# Patient Record
Sex: MALE | Race: WHITE | HISPANIC OR LATINO | Employment: OTHER | URBAN - METROPOLITAN AREA
[De-identification: names, ages, dates, MRNs, and addresses within clinical notes are randomized per-mention and may not be internally consistent; named-entity substitution may affect disease eponyms.]

---

## 2018-10-08 ENCOUNTER — HOSPITAL ENCOUNTER (OUTPATIENT)
Dept: NON INVASIVE DIAGNOSTICS | Facility: HOSPITAL | Age: 38
Discharge: HOME | End: 2018-10-08
Payer: MEDICARE

## 2018-10-08 ENCOUNTER — TRANSCRIBE ORDERS (OUTPATIENT)
Dept: ADMINISTRATIVE | Facility: HOSPITAL | Age: 38
End: 2018-10-08

## 2018-10-08 ENCOUNTER — APPOINTMENT (OUTPATIENT)
Dept: LAB | Facility: HOSPITAL | Age: 38
End: 2018-10-08
Payer: MEDICARE

## 2018-10-08 DIAGNOSIS — Z79.899 NEED FOR PROPHYLACTIC CHEMOTHERAPY: ICD-10-CM

## 2018-10-08 DIAGNOSIS — F31.2 SEVERE MANIC BIPOLAR I DISORDER WITH PSYCHOTIC FEATURES (HCC): ICD-10-CM

## 2018-10-08 DIAGNOSIS — Z79.899 NEED FOR PROPHYLACTIC CHEMOTHERAPY: Primary | ICD-10-CM

## 2018-10-08 LAB
ALBUMIN SERPL BCP-MCNC: 4.6 G/DL (ref 3–5.2)
ALP SERPL-CCNC: 101 U/L (ref 43–122)
ALT SERPL W P-5'-P-CCNC: 35 U/L (ref 9–52)
ANION GAP SERPL CALCULATED.3IONS-SCNC: 8 MMOL/L (ref 5–14)
AST SERPL W P-5'-P-CCNC: 32 U/L (ref 17–59)
BASOPHILS # BLD AUTO: 0 THOUSANDS/ΜL (ref 0–0.1)
BASOPHILS NFR BLD AUTO: 0 % (ref 0–1)
BILIRUB SERPL-MCNC: 0.3 MG/DL
BUN SERPL-MCNC: 10 MG/DL (ref 5–25)
CALCIUM SERPL-MCNC: 9.7 MG/DL (ref 8.4–10.2)
CHLORIDE SERPL-SCNC: 102 MMOL/L (ref 97–108)
CHOLEST SERPL-MCNC: 221 MG/DL
CO2 SERPL-SCNC: 30 MMOL/L (ref 22–30)
CREAT SERPL-MCNC: 0.76 MG/DL (ref 0.7–1.5)
EOSINOPHIL # BLD AUTO: 0.3 THOUSAND/ΜL (ref 0–0.4)
EOSINOPHIL NFR BLD AUTO: 4 % (ref 0–6)
ERYTHROCYTE [DISTWIDTH] IN BLOOD BY AUTOMATED COUNT: 12.8 %
GFR SERPL CREATININE-BSD FRML MDRD: 118 ML/MIN/1.73SQ M
GLUCOSE P FAST SERPL-MCNC: 110 MG/DL (ref 70–99)
HCT VFR BLD AUTO: 46.6 % (ref 41–53)
HDLC SERPL-MCNC: 30 MG/DL (ref 40–59)
HGB BLD-MCNC: 15.9 G/DL (ref 13.5–17.5)
LDLC SERPL CALC-MCNC: 128 MG/DL
LYMPHOCYTES # BLD AUTO: 2.2 THOUSANDS/ΜL (ref 0.5–4)
LYMPHOCYTES NFR BLD AUTO: 30 % (ref 20–50)
MCH RBC QN AUTO: 31.3 PG (ref 26–34)
MCHC RBC AUTO-ENTMCNC: 34.1 G/DL (ref 31–36)
MCV RBC AUTO: 92 FL (ref 80–100)
MONOCYTES # BLD AUTO: 0.5 THOUSAND/ΜL (ref 0.2–0.9)
MONOCYTES NFR BLD AUTO: 6 % (ref 1–10)
NEUTROPHILS # BLD AUTO: 4.3 THOUSANDS/ΜL (ref 1.8–7.8)
NEUTS SEG NFR BLD AUTO: 59 % (ref 45–65)
NONHDLC SERPL-MCNC: 191 MG/DL
PLATELET # BLD AUTO: 229 THOUSANDS/UL (ref 150–450)
PMV BLD AUTO: 8.6 FL (ref 8.9–12.7)
POTASSIUM SERPL-SCNC: 4.5 MMOL/L (ref 3.6–5)
PROT SERPL-MCNC: 7.7 G/DL (ref 5.9–8.4)
RBC # BLD AUTO: 5.07 MILLION/UL (ref 4.5–5.9)
SODIUM SERPL-SCNC: 140 MMOL/L (ref 137–147)
TRIGL SERPL-MCNC: 316 MG/DL
TSH SERPL DL<=0.05 MIU/L-ACNC: 2.51 UIU/ML (ref 0.47–4.68)
WBC # BLD AUTO: 7.3 THOUSAND/UL (ref 4.5–11)

## 2018-10-08 PROCEDURE — 93005 ELECTROCARDIOGRAM TRACING: CPT

## 2018-10-08 PROCEDURE — 80053 COMPREHEN METABOLIC PANEL: CPT

## 2018-10-08 PROCEDURE — 84443 ASSAY THYROID STIM HORMONE: CPT

## 2018-10-08 PROCEDURE — 80061 LIPID PANEL: CPT

## 2018-10-08 PROCEDURE — 85025 COMPLETE CBC W/AUTO DIFF WBC: CPT

## 2018-10-08 PROCEDURE — 36415 COLL VENOUS BLD VENIPUNCTURE: CPT

## 2018-10-09 LAB
ATRIAL RATE: 63 BPM
P AXIS: 6 DEGREES
PR INTERVAL: 134 MS
QRS AXIS: 20 DEGREES
QRSD INTERVAL: 78 MS
QT INTERVAL: 416 MS
QTC INTERVAL: 425 MS
T WAVE AXIS: 20 DEGREES
VENTRICULAR RATE: 63 BPM

## 2018-10-09 PROCEDURE — 93010 ELECTROCARDIOGRAM REPORT: CPT | Performed by: INTERNAL MEDICINE

## 2019-09-05 ENCOUNTER — TELEPHONE (OUTPATIENT)
Dept: FAMILY MEDICINE CLINIC | Facility: CLINIC | Age: 39
End: 2019-09-05

## 2020-12-07 ENCOUNTER — PATIENT OUTREACH (OUTPATIENT)
Dept: FAMILY MEDICINE CLINIC | Facility: CLINIC | Age: 40
End: 2020-12-07

## 2020-12-07 DIAGNOSIS — Z74.8 ASSISTANCE NEEDED WITH TRANSPORTATION: Primary | ICD-10-CM

## 2020-12-07 DIAGNOSIS — R87.616 SATISFACTORY CERVICAL SMEAR BUT LACKING TRANSFORMATION ZONE: ICD-10-CM

## 2020-12-07 PROBLEM — Z78.9 NEEDS ASSISTANCE WITH COMMUNITY RESOURCES: Status: ACTIVE | Noted: 2020-12-07

## 2020-12-07 PROBLEM — Z83.3 FAMILY HISTORY OF DIABETES MELLITUS (DM): Status: ACTIVE | Noted: 2020-12-07

## 2020-12-07 PROBLEM — K59.00 CONSTIPATION: Status: ACTIVE | Noted: 2020-12-07

## 2020-12-07 PROBLEM — F31.70 BIPOLAR AFFECTIVE DISORDER IN REMISSION (HCC): Status: ACTIVE | Noted: 2020-12-07

## 2020-12-07 PROBLEM — G89.21 CHRONIC PAIN AFTER TRAUMATIC INJURY: Status: ACTIVE | Noted: 2020-12-07

## 2020-12-07 PROBLEM — F43.10 PTSD (POST-TRAUMATIC STRESS DISORDER): Status: ACTIVE | Noted: 2020-12-07

## 2020-12-08 ENCOUNTER — PATIENT OUTREACH (OUTPATIENT)
Dept: FAMILY MEDICINE CLINIC | Facility: CLINIC | Age: 40
End: 2020-12-08

## 2020-12-09 ENCOUNTER — PATIENT OUTREACH (OUTPATIENT)
Dept: FAMILY MEDICINE CLINIC | Facility: CLINIC | Age: 40
End: 2020-12-09

## 2020-12-09 ENCOUNTER — TELEPHONE (OUTPATIENT)
Dept: FAMILY MEDICINE CLINIC | Facility: CLINIC | Age: 40
End: 2020-12-09

## 2020-12-10 ENCOUNTER — TELEPHONE (OUTPATIENT)
Dept: FAMILY MEDICINE CLINIC | Facility: CLINIC | Age: 40
End: 2020-12-10

## 2020-12-16 ENCOUNTER — PATIENT OUTREACH (OUTPATIENT)
Dept: FAMILY MEDICINE CLINIC | Facility: CLINIC | Age: 40
End: 2020-12-16

## 2020-12-18 ENCOUNTER — PATIENT OUTREACH (OUTPATIENT)
Dept: FAMILY MEDICINE CLINIC | Facility: CLINIC | Age: 40
End: 2020-12-18

## 2020-12-23 ENCOUNTER — PATIENT OUTREACH (OUTPATIENT)
Dept: FAMILY MEDICINE CLINIC | Facility: CLINIC | Age: 40
End: 2020-12-23

## 2020-12-30 ENCOUNTER — PATIENT OUTREACH (OUTPATIENT)
Dept: FAMILY MEDICINE CLINIC | Facility: CLINIC | Age: 40
End: 2020-12-30

## 2021-01-06 ENCOUNTER — PATIENT OUTREACH (OUTPATIENT)
Dept: FAMILY MEDICINE CLINIC | Facility: CLINIC | Age: 41
End: 2021-01-06

## 2021-01-06 NOTE — PROGRESS NOTES
Outgoing Calls:  1/6/21    CHW did call [place] to request information on rescheduled [facility] evaluation with [facility]  CHW was assisted by Rufina Primrose and he informed CHW that the evaluation was rescheduled for 2/10/21  CHW did call pt to inform him of this  Pt did not answer and voice message was not set up  CHW to outreach to pt on 1/13/21

## 2021-01-13 ENCOUNTER — PATIENT OUTREACH (OUTPATIENT)
Dept: FAMILY MEDICINE CLINIC | Facility: CLINIC | Age: 41
End: 2021-01-13

## 2021-01-13 NOTE — LETTER
01/13/21    Dear Edel Browne,    I have made several attempts to call you by phone  I called you on 1/6/21 and 1/13/21 and both times was unable to leave a voice message  It is important that you contact me back at [phone] so that I can assist with your care needs        Sincerely,       Malena Ragsdale, W

## 2021-01-13 NOTE — PROGRESS NOTES
Outgoing Call:  1/13/21    CHW did call [facility] to request information on rescheduled [facility] evaluation with [facility] on 1/6/21  CHW did call pt on 1/6/21 to inform him of this  Pt did not answer and voice message was not set up  This is still the case today  CHW to mail letter to pt informing him of this  CHW to outreach to pt on 1/20/21

## 2021-01-13 NOTE — PROGRESS NOTES
Incoming Call:  1/13/21    CHW did receive a call from pt  Pt stated that he noticed missed call  CHW informed him that she could not leave a message because mailbox is not set up  Pt stated that he has not set that up and is aware  CHW informed pt that she had submitted a letter of attempted contact, from Leann Horton, and he could disregard it when he receives it  CHW was able to inform pt of upcoming evaluation at [facility] for [address] [facility]  Pt stated that he does have the new date written down  CHW informed pt that she will touch base with him on a weekly basis to check up on him and remind him of evaluation  Pt thanked CHW for support  Next outreach has been scheduled for 1/20/21

## 2021-01-20 ENCOUNTER — PATIENT OUTREACH (OUTPATIENT)
Dept: FAMILY MEDICINE CLINIC | Facility: CLINIC | Age: 41
End: 2021-01-20

## 2021-01-20 NOTE — PROGRESS NOTES
Outgoing Call:  1/20/21    CHW did call pt for a well check and remind him of upcoming [facility] evaluation  Pt not available and voice mail not set up  CHW unable to leave voice message  Next outreach is scheduled for 1/27/21

## 2021-01-28 ENCOUNTER — PATIENT OUTREACH (OUTPATIENT)
Dept: FAMILY MEDICINE CLINIC | Facility: CLINIC | Age: 41
End: 2021-01-28

## 2021-01-28 NOTE — PROGRESS NOTES
Outgoing Call:  1/28/21    CHW did call pt to discuss [facility] evaluation coming up in less than two weeks  Pt was rushed and apologized, stating that he is on the other line with his daughter  CHW informed him that she will call him next week to remind him of his evaluation  Pt thanked CHW  Next outreach is scheduled for 2/5/21

## 2021-02-05 ENCOUNTER — PATIENT OUTREACH (OUTPATIENT)
Dept: FAMILY MEDICINE CLINIC | Facility: CLINIC | Age: 41
End: 2021-02-05

## 2021-02-05 NOTE — PROGRESS NOTES
Outgoing Call:  2/5/21    CHW did call pt to discuss [facility] evaluation  Pt was not available and voice mailbox was not set up  Next outreach is scheduled for 2/12/21

## 2021-02-12 ENCOUNTER — PATIENT OUTREACH (OUTPATIENT)
Dept: FAMILY MEDICINE CLINIC | Facility: CLINIC | Age: 41
End: 2021-02-12

## 2021-02-12 NOTE — LETTER
02/12/21    Dear Chapis Hatfield,    I am a [facility] Worker with [address]   [address] [facility]  ANGEL Jimenez   [phone]  I have made several attempts to call you by phone  It is important that you contact me back at [phone] so that I can assist with your care needs  I have attempted to call you on  1/20/21, 1/28/21, 2/5/21, and 2/12/21  I was informed by Marquis Moore that you have missed your rescheduled [place] evaluation with [facility] on 2/10/21  If you are still interested in completing this process, please call [facility] at [phone], to reschedule            Sincerely,       JARED Ferguson

## 2021-02-12 NOTE — PROGRESS NOTES
Outgoing Calls:  2/12/2021    CHW did call [place] to get an update on pt's evaluation with [facility] which was rescheduled for 2/10/21  CHW was informed that pt was a no show for that appointment  CHW was informed that pt would need to call on his own if he is interested in rescheduling  CHW was also informed pt may not be offered transportation if he does reschedule, due to missing more than one appointment  [facility] did [facility] which [facility]  CHW did send pt a letter  CHW will close this referral on this date due to lack of contact  CHW has attempted to contact pt on 1/20, 1/28, 2/5, and today

## 2021-02-25 ENCOUNTER — TELEPHONE (OUTPATIENT)
Dept: FAMILY MEDICINE CLINIC | Facility: CLINIC | Age: 41
End: 2021-02-25

## 2021-03-22 ENCOUNTER — LAB (OUTPATIENT)
Dept: LAB | Facility: HOSPITAL | Age: 41
End: 2021-03-22
Payer: MEDICARE

## 2021-03-22 DIAGNOSIS — Z79.899 ENCOUNTER FOR LONG-TERM (CURRENT) USE OF OTHER MEDICATIONS: Primary | ICD-10-CM

## 2021-03-22 DIAGNOSIS — R07.9 CHEST PAIN, UNSPECIFIED TYPE: ICD-10-CM

## 2021-03-22 DIAGNOSIS — F43.10 PTSD (POST-TRAUMATIC STRESS DISORDER): ICD-10-CM

## 2021-03-22 LAB
25(OH)D3 SERPL-MCNC: 8.1 NG/ML (ref 30–100)
ALBUMIN SERPL BCP-MCNC: 4.8 G/DL (ref 3–5.2)
ALP SERPL-CCNC: 100 U/L (ref 43–122)
ALT SERPL W P-5'-P-CCNC: 30 U/L (ref 9–52)
ANION GAP SERPL CALCULATED.3IONS-SCNC: 8 MMOL/L (ref 5–14)
AST SERPL W P-5'-P-CCNC: 33 U/L (ref 17–59)
BASOPHILS # BLD AUTO: 0 THOUSANDS/ΜL (ref 0–0.1)
BASOPHILS NFR BLD AUTO: 1 % (ref 0–1)
BILIRUB SERPL-MCNC: 0.5 MG/DL
BUN SERPL-MCNC: 14 MG/DL (ref 5–25)
CALCIUM SERPL-MCNC: 9.5 MG/DL (ref 8.4–10.2)
CHLORIDE SERPL-SCNC: 103 MMOL/L (ref 97–108)
CHOLEST SERPL-MCNC: 236 MG/DL
CO2 SERPL-SCNC: 28 MMOL/L (ref 22–30)
CREAT SERPL-MCNC: 0.88 MG/DL (ref 0.7–1.5)
EOSINOPHIL # BLD AUTO: 0.3 THOUSAND/ΜL (ref 0–0.4)
EOSINOPHIL NFR BLD AUTO: 3 % (ref 0–6)
ERYTHROCYTE [DISTWIDTH] IN BLOOD BY AUTOMATED COUNT: 12.6 %
EST. AVERAGE GLUCOSE BLD GHB EST-MCNC: 108 MG/DL
GFR SERPL CREATININE-BSD FRML MDRD: 109 ML/MIN/1.73SQ M
GLUCOSE P FAST SERPL-MCNC: 121 MG/DL (ref 70–99)
HBA1C MFR BLD: 5.4 %
HCT VFR BLD AUTO: 44.9 % (ref 41–53)
HDLC SERPL-MCNC: 24 MG/DL
HGB BLD-MCNC: 15 G/DL (ref 13.5–17.5)
LDLC SERPL CALC-MCNC: 169 MG/DL
LYMPHOCYTES # BLD AUTO: 2.6 THOUSANDS/ΜL (ref 0.5–4)
LYMPHOCYTES NFR BLD AUTO: 31 % (ref 25–45)
MCH RBC QN AUTO: 30.7 PG (ref 26–34)
MCHC RBC AUTO-ENTMCNC: 33.4 G/DL (ref 31–36)
MCV RBC AUTO: 92 FL (ref 80–100)
MONOCYTES # BLD AUTO: 0.6 THOUSAND/ΜL (ref 0.2–0.9)
MONOCYTES NFR BLD AUTO: 7 % (ref 1–10)
NEUTROPHILS # BLD AUTO: 4.9 THOUSANDS/ΜL (ref 1.8–7.8)
NEUTS SEG NFR BLD AUTO: 59 % (ref 45–65)
NONHDLC SERPL-MCNC: 212 MG/DL
PLATELET # BLD AUTO: 248 THOUSANDS/UL (ref 150–450)
PMV BLD AUTO: 8.8 FL (ref 8.9–12.7)
POTASSIUM SERPL-SCNC: 4.4 MMOL/L (ref 3.6–5)
PROT SERPL-MCNC: 8.2 G/DL (ref 5.9–8.4)
RBC # BLD AUTO: 4.88 MILLION/UL (ref 4.5–5.9)
SODIUM SERPL-SCNC: 139 MMOL/L (ref 137–147)
TRIGL SERPL-MCNC: 215 MG/DL
TSH SERPL DL<=0.05 MIU/L-ACNC: 3.6 UIU/ML (ref 0.47–4.68)
WBC # BLD AUTO: 8.4 THOUSAND/UL (ref 4.5–11)

## 2021-03-22 PROCEDURE — 85025 COMPLETE CBC W/AUTO DIFF WBC: CPT

## 2021-03-22 PROCEDURE — 84443 ASSAY THYROID STIM HORMONE: CPT

## 2021-03-22 PROCEDURE — 80061 LIPID PANEL: CPT

## 2021-03-22 PROCEDURE — 82306 VITAMIN D 25 HYDROXY: CPT

## 2021-03-22 PROCEDURE — 80053 COMPREHEN METABOLIC PANEL: CPT

## 2021-03-22 PROCEDURE — 80307 DRUG TEST PRSMV CHEM ANLYZR: CPT

## 2021-03-22 PROCEDURE — 36415 COLL VENOUS BLD VENIPUNCTURE: CPT

## 2021-03-22 PROCEDURE — 83036 HEMOGLOBIN GLYCOSYLATED A1C: CPT

## 2021-03-22 PROCEDURE — 93005 ELECTROCARDIOGRAM TRACING: CPT

## 2021-03-23 LAB
ATRIAL RATE: 81 BPM
P AXIS: 6 DEGREES
PR INTERVAL: 126 MS
QRS AXIS: 47 DEGREES
QRSD INTERVAL: 78 MS
QT INTERVAL: 386 MS
QTC INTERVAL: 448 MS
T WAVE AXIS: 40 DEGREES
VENTRICULAR RATE: 81 BPM

## 2021-03-23 PROCEDURE — 93010 ELECTROCARDIOGRAM REPORT: CPT | Performed by: INTERNAL MEDICINE

## 2021-03-23 NOTE — RESULT ENCOUNTER NOTE
LDL cholesterol was high with a level of 169  It should be under 100  Recommend getting regular exercise of at least moderate intensity 3 5 hours a week  I also recommend eating a low carbohydrate diet and making sure that carbs are whole grain when eaten  Also recommend trying to avoid unhealthy fats such as vegetable oil, canola oil, peanut oil  These are often cooked with but can be found in a lot of snacks such as cookies, cakes, chips  Blood sugar was also elevated so I would recommend rechecking in 3 months   Thyroid was normal

## 2021-03-25 LAB
AMPHETAMINES UR QL SCN: NEGATIVE NG/ML
BARBITURATES UR QL SCN: NEGATIVE NG/ML
BENZODIAZ UR QL: NEGATIVE NG/ML
BZE UR QL: NEGATIVE NG/ML
CANNABINOIDS UR QL SCN: POSITIVE
METHADONE UR QL SCN: NEGATIVE NG/ML
OPIATES UR QL: NEGATIVE NG/ML
PCP UR QL: NEGATIVE NG/ML
PROPOXYPH UR QL SCN: NEGATIVE NG/ML

## 2021-04-06 ENCOUNTER — OFFICE VISIT (OUTPATIENT)
Dept: FAMILY MEDICINE CLINIC | Facility: CLINIC | Age: 41
End: 2021-04-06

## 2021-04-06 VITALS
HEART RATE: 74 BPM | RESPIRATION RATE: 18 BRPM | OXYGEN SATURATION: 97 % | SYSTOLIC BLOOD PRESSURE: 140 MMHG | DIASTOLIC BLOOD PRESSURE: 98 MMHG | TEMPERATURE: 97.7 F | WEIGHT: 203 LBS | BODY MASS INDEX: 31.86 KG/M2 | HEIGHT: 67 IN

## 2021-04-06 DIAGNOSIS — F31.70 BIPOLAR AFFECTIVE DISORDER IN REMISSION (HCC): ICD-10-CM

## 2021-04-06 DIAGNOSIS — R07.9 CHEST PAIN, UNSPECIFIED TYPE: ICD-10-CM

## 2021-04-06 DIAGNOSIS — G89.29 CHRONIC LEFT-SIDED HEADACHE: ICD-10-CM

## 2021-04-06 DIAGNOSIS — E55.9 VITAMIN D DEFICIENCY: ICD-10-CM

## 2021-04-06 DIAGNOSIS — F31.2 BIPOLAR DISORDER, CURRENT EPISODE MANIC SEVERE WITH PSYCHOTIC FEATURES (HCC): Primary | ICD-10-CM

## 2021-04-06 DIAGNOSIS — R51.9 CHRONIC LEFT-SIDED HEADACHE: ICD-10-CM

## 2021-04-06 DIAGNOSIS — Z00.00 MEDICARE ANNUAL WELLNESS VISIT, SUBSEQUENT: ICD-10-CM

## 2021-04-06 DIAGNOSIS — G89.29 CHRONIC LOW BACK PAIN, UNSPECIFIED BACK PAIN LATERALITY, UNSPECIFIED WHETHER SCIATICA PRESENT: ICD-10-CM

## 2021-04-06 DIAGNOSIS — M54.50 CHRONIC LOW BACK PAIN, UNSPECIFIED BACK PAIN LATERALITY, UNSPECIFIED WHETHER SCIATICA PRESENT: ICD-10-CM

## 2021-04-06 PROCEDURE — 99214 OFFICE O/P EST MOD 30 MIN: CPT | Performed by: PHYSICIAN ASSISTANT

## 2021-04-06 PROCEDURE — G0438 PPPS, INITIAL VISIT: HCPCS | Performed by: PHYSICIAN ASSISTANT

## 2021-04-06 RX ORDER — BUPROPION HYDROCHLORIDE 75 MG/1
TABLET ORAL
COMMUNITY
Start: 2021-01-05 | End: 2021-05-11

## 2021-04-06 RX ORDER — LIDOCAINE 50 MG/G
OINTMENT TOPICAL 3 TIMES DAILY PRN
Qty: 50 G | Refills: 1 | Status: SHIPPED | OUTPATIENT
Start: 2021-04-06

## 2021-04-06 RX ORDER — ERGOCALCIFEROL 1.25 MG/1
50000 CAPSULE ORAL WEEKLY
Qty: 4 CAPSULE | Refills: 2 | Status: SHIPPED | OUTPATIENT
Start: 2021-04-06
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
## 2021-04-06 NOTE — PATIENT INSTRUCTIONS

## 2021-04-07 PROBLEM — G89.29 CHRONIC LEFT-SIDED HEADACHE: Status: ACTIVE | Noted: 2021-04-07

## 2021-04-07 PROBLEM — R51.9 CHRONIC LEFT-SIDED HEADACHE: Status: ACTIVE | Noted: 2021-04-07

## 2021-04-07 PROBLEM — M54.50 CHRONIC LOW BACK PAIN: Status: ACTIVE | Noted: 2021-04-07

## 2021-04-07 PROBLEM — R07.9 CHEST PAIN: Status: ACTIVE | Noted: 2021-04-07

## 2021-04-07 PROBLEM — G89.29 CHRONIC LOW BACK PAIN: Status: ACTIVE | Noted: 2021-04-07

## 2021-04-07 NOTE — ASSESSMENT & PLAN NOTE
Continue with psych  Depression Screening Follow-up Plan: Patient's depression screening was positive with a PHQ-2 score of 6  Their PHQ-9 score was 16  Depression Screening Follow-up Plan: Patient's depression screening was positive with a PHQ-2 score of 6  Their PHQ-9 score was 16  Continue regular follow-up with their psychologist/therapist/psychiatrist who is managing their mental health condition(s)

## 2021-04-08 ENCOUNTER — TELEPHONE (OUTPATIENT)
Dept: FAMILY MEDICINE CLINIC | Facility: CLINIC | Age: 41
End: 2021-04-08

## 2021-04-08 NOTE — TELEPHONE ENCOUNTER
Please be advised  Patient came in person to inform pcp of elevated BP with Blood Pressure Elevation Form from [employer] attached to this encounter

## 2021-04-13 ENCOUNTER — IMMUNIZATIONS (OUTPATIENT)
Dept: FAMILY MEDICINE CLINIC | Facility: HOSPITAL | Age: 41
End: 2021-04-13

## 2021-04-13 DIAGNOSIS — Z23 ENCOUNTER FOR IMMUNIZATION: Primary | ICD-10-CM

## 2021-04-13 PROCEDURE — 91301 SARS-COV-2 / COVID-19 MRNA VACCINE (MODERNA) 100 MCG: CPT

## 2021-04-13 PROCEDURE — 0011A SARS-COV-2 / COVID-19 MRNA VACCINE (MODERNA) 100 MCG: CPT

## 2021-05-10 ENCOUNTER — PATIENT OUTREACH (OUTPATIENT)
Dept: FAMILY MEDICINE CLINIC | Facility: CLINIC | Age: 41
End: 2021-05-10

## 2021-05-10 ENCOUNTER — OFFICE VISIT (OUTPATIENT)
Dept: FAMILY MEDICINE CLINIC | Facility: CLINIC | Age: 41
End: 2021-05-10

## 2021-05-10 VITALS
WEIGHT: 208 LBS | HEART RATE: 76 BPM | DIASTOLIC BLOOD PRESSURE: 80 MMHG | HEIGHT: 67 IN | OXYGEN SATURATION: 97 % | SYSTOLIC BLOOD PRESSURE: 140 MMHG | RESPIRATION RATE: 16 BRPM | BODY MASS INDEX: 32.65 KG/M2 | TEMPERATURE: 97.6 F

## 2021-05-10 DIAGNOSIS — Z74.8 ASSISTANCE WITH TRANSPORTATION: Primary | ICD-10-CM

## 2021-05-10 DIAGNOSIS — R73.9 HYPERGLYCEMIA: Primary | ICD-10-CM

## 2021-05-10 DIAGNOSIS — I10 BENIGN HYPERTENSION: ICD-10-CM

## 2021-05-10 DIAGNOSIS — M54.50 CHRONIC LOW BACK PAIN, UNSPECIFIED BACK PAIN LATERALITY, UNSPECIFIED WHETHER SCIATICA PRESENT: ICD-10-CM

## 2021-05-10 DIAGNOSIS — G89.29 CHRONIC LOW BACK PAIN, UNSPECIFIED BACK PAIN LATERALITY, UNSPECIFIED WHETHER SCIATICA PRESENT: ICD-10-CM

## 2021-05-10 DIAGNOSIS — E66.09 CLASS 1 OBESITY DUE TO EXCESS CALORIES WITH SERIOUS COMORBIDITY AND BODY MASS INDEX (BMI) OF 32.0 TO 32.9 IN ADULT: ICD-10-CM

## 2021-05-10 DIAGNOSIS — Z91.89 LACK OF ACCESS TO TRANSPORTATION: ICD-10-CM

## 2021-05-10 PROCEDURE — 99214 OFFICE O/P EST MOD 30 MIN: CPT | Performed by: PHYSICIAN ASSISTANT

## 2021-05-10 RX ORDER — LISINOPRIL 5 MG/1
5 TABLET ORAL DAILY
Qty: 90 TABLET | Refills: 1 | Status: SHIPPED | OUTPATIENT
Start: 2021-05-10
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
## 2021-05-10 NOTE — PROGRESS NOTES
CHUCK HWANG received consult from Provider at time of Pt visit today as Pt is known to this CHUCK HWANG however lost contact with CHW and [facility] back in January of 2021  Pt states today that he lost contact as he had very limited minutes on his phone and lost the ability to connect with care management team  Pt presents today stating that he is still in need of assistance getting connected to [facility], housing, and, DME and help with his phone  Pt had applied for Milton Akhtar previously however missed his appointment for [facility] evaluation  Pt requires assistance rescheduling this evaluation  Pt also states that he lives in the 4th floor in an apartment and is in need of assistance applying for alternate housing as he struggles to walk and afford rent  Pt was working with CHW and states that CHW helped Pt and would appreciate CHW assistance again  CHUCK HWANG will pace referral for CHW  Pt states that provider ordered Pt a cane back in April however he has not received it  CHUCK Hwang contacted [facility] Virgen Iverson who expressed that it was not in their system  CHUCK HWANG faxed order and referral to [facility]gen Iverson who stated that once reviewed, will ship Pt a cane to his home address  CHUCK HWANG then assisted Pt in applying for an EEB program through [facility] to help Pt receive temporary unlimited minutes on his phone  Pt was very grateful  Pt reports that he has a very limited support system  He states the only family he keeps in touch with is his father in [place]  Pt states that his wife left him recently and he has no other support  Pt states that he attends [employer] almost daily as well as the [employer] [facility] [employer] [facility] is in and has been relying on the support of the  whos number Pt provided [phone]  Pt states he continues to follow with Psych at [facility]  CHUCK HWANG will place referral for CHW to provide assistance and continue to follow Pt for further needs

## 2021-05-11 PROBLEM — E66.09 CLASS 1 OBESITY DUE TO EXCESS CALORIES WITH SERIOUS COMORBIDITY AND BODY MASS INDEX (BMI) OF 32.0 TO 32.9 IN ADULT: Status: ACTIVE | Noted: 2021-05-11

## 2021-05-11 PROBLEM — R73.9 HYPERGLYCEMIA: Status: ACTIVE | Noted: 2021-05-11

## 2021-05-11 PROBLEM — I10 BENIGN HYPERTENSION: Status: ACTIVE | Noted: 2021-05-11

## 2021-05-11 NOTE — ASSESSMENT & PLAN NOTE
BMI Counseling: Body mass index is 32 58 kg/m²  The BMI is above normal  Nutrition recommendations include decreasing soda and/or juice intake and moderation in carbohydrate intake

## 2021-05-11 NOTE — ASSESSMENT & PLAN NOTE
CT was ordered last month but could not be scheduled because he ran out of minutes on his phone  Scheduled today while in office

## 2021-05-12 ENCOUNTER — HOSPITAL ENCOUNTER (OUTPATIENT)
Dept: CT IMAGING | Facility: HOSPITAL | Age: 41
Discharge: HOME | End: 2021-05-12
Payer: MEDICARE

## 2021-05-12 DIAGNOSIS — G89.29 CHRONIC LOW BACK PAIN, UNSPECIFIED BACK PAIN LATERALITY, UNSPECIFIED WHETHER SCIATICA PRESENT: ICD-10-CM

## 2021-05-12 DIAGNOSIS — G89.29 CHRONIC LEFT-SIDED HEADACHE: ICD-10-CM

## 2021-05-12 DIAGNOSIS — R51.9 CHRONIC LEFT-SIDED HEADACHE: ICD-10-CM

## 2021-05-12 DIAGNOSIS — M54.50 CHRONIC LOW BACK PAIN, UNSPECIFIED BACK PAIN LATERALITY, UNSPECIFIED WHETHER SCIATICA PRESENT: ICD-10-CM

## 2021-05-12 PROCEDURE — 70450 CT HEAD/BRAIN W/O DYE: CPT

## 2021-05-12 PROCEDURE — 72131 CT LUMBAR SPINE W/O DYE: CPT

## 2021-05-12 PROCEDURE — G1004 CDSM NDSC: HCPCS

## 2021-05-13 ENCOUNTER — IMMUNIZATIONS (OUTPATIENT)
Dept: FAMILY MEDICINE CLINIC | Facility: HOSPITAL | Age: 41
End: 2021-05-13

## 2021-05-13 DIAGNOSIS — Z23 ENCOUNTER FOR IMMUNIZATION: Primary | ICD-10-CM

## 2021-05-13 PROCEDURE — 91301 SARS-COV-2 / COVID-19 MRNA VACCINE (MODERNA) 100 MCG: CPT

## 2021-05-13 PROCEDURE — 0012A SARS-COV-2 / COVID-19 MRNA VACCINE (MODERNA) 100 MCG: CPT

## 2021-05-14 ENCOUNTER — PATIENT OUTREACH (OUTPATIENT)
Dept: FAMILY MEDICINE CLINIC | Facility: CLINIC | Age: 41
End: 2021-05-14

## 2021-05-14 NOTE — PROGRESS NOTES
Outgoing Call:  5/14/2021    CHW did call pt to discuss referral for [address] services  CHW had previously worked with pt on this  Pt did not follow through with his scheduled [facility] evaluations  Pt stated that he is interested in working on this again however wants CHW to call him back sometime next week  Pt stated that he needs to be at [facility] by [address] did call pt at 1:50PM  CHW agreed to call pt back and informed him that he will need to follow through with assistance given by CHW or referral will be closed  Pt stated that he understood  Next outreach is scheduled for 5/17/2021
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
## 2021-05-19 NOTE — RESULT ENCOUNTER NOTE
His CT scan of his lower back actually looks good  There was no bone injuries and there was no significant disc out of place  If he is willing to try physical therapy I can put a referral in for    We can even get him set up for home therapy if he prefers at

## 2021-05-21 ENCOUNTER — PATIENT OUTREACH (OUTPATIENT)
Dept: FAMILY MEDICINE CLINIC | Facility: CLINIC | Age: 41
End: 2021-05-21

## 2021-05-21 NOTE — PROGRESS NOTES
Outgoing Calls:  5/21/2021    CHW did call pt to discuss rescheduling his [facility] evaluation for [address] services  CHW did call [facility] with pt and left a detailed message requesting a call in return  CHW did ask pt about a cane which he was waiting for that his PCP did prescribe for him to use  Pt confirmed that he did receive it  CHW discussed housing need with pt  Pt stated that he currently pays $700 a month for rent and lives on a fourth floor but is having difficulty due to his back issues  Pt stated that he is interested in moving to the [facility] apartments on [address] informed him that this would not be possible anytime soon as the wait list has been closed for the past five years and they are not accepting applications, wait list is also at least two years once they do begin to accept new applications  Pt stated that his brother-in -law did just move to the area three months ago and was able to get housing there  CHW informed that she will not be able to get him housing at that location as they have not been accepting new applications  Pt stated that he has time and is willing to wait for the right opportunity to come along and would like to work on [address] services first      Next outreach is scheduled for 5/28/2021

## 2021-05-28 ENCOUNTER — PATIENT OUTREACH (OUTPATIENT)
Dept: FAMILY MEDICINE CLINIC | Facility: CLINIC | Age: 41
End: 2021-05-28
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
## 2021-06-02 ENCOUNTER — PATIENT OUTREACH (OUTPATIENT)
Dept: FAMILY MEDICINE CLINIC | Facility: CLINIC | Age: 41
End: 2021-06-02
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
## 2021-06-03 ENCOUNTER — PATIENT OUTREACH (OUTPATIENT)
Dept: FAMILY MEDICINE CLINIC | Facility: CLINIC | Age: 41
End: 2021-06-03
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
## 2021-06-04 ENCOUNTER — PATIENT OUTREACH (OUTPATIENT)
Dept: FAMILY MEDICINE CLINIC | Facility: CLINIC | Age: 41
End: 2021-06-04
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
## 2021-06-09 ENCOUNTER — TELEPHONE (OUTPATIENT)
Dept: FAMILY MEDICINE CLINIC | Facility: CLINIC | Age: 41
End: 2021-06-09

## 2021-06-09 ENCOUNTER — PATIENT OUTREACH (OUTPATIENT)
Dept: FAMILY MEDICINE CLINIC | Facility: CLINIC | Age: 41
End: 2021-06-09

## 2021-06-09 DIAGNOSIS — F43.10 PTSD (POST-TRAUMATIC STRESS DISORDER): Primary | ICD-10-CM
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
## 2021-06-09 NOTE — TELEPHONE ENCOUNTER
Pt states he is trying to make an appt with [facility] mental health on [address]  But is in need of a referral please advise thank you

## 2021-06-10 ENCOUNTER — TELEPHONE (OUTPATIENT)
Dept: FAMILY MEDICINE CLINIC | Facility: CLINIC | Age: 41
End: 2021-06-10

## 2021-06-10 DIAGNOSIS — F43.10 PTSD (POST-TRAUMATIC STRESS DISORDER): Primary | ICD-10-CM

## 2021-06-10 DIAGNOSIS — F31.70 BIPOLAR AFFECTIVE DISORDER IN REMISSION (HCC): ICD-10-CM

## 2021-06-10 NOTE — TELEPHONE ENCOUNTER
Late entry:  I had received message at 12:36 from pt and called to clarify    Confirmed with [facility], just a PCP order needed, not insurance referral    Faxed to [phone]  Confirmation received

## 2021-06-11 ENCOUNTER — PATIENT OUTREACH (OUTPATIENT)
Dept: FAMILY MEDICINE CLINIC | Facility: CLINIC | Age: 41
End: 2021-06-11

## 2021-06-11 NOTE — PROGRESS NOTES
Incoming Call / Gerhardt Canny Email:  6/11/2021    CHW did receive a call from pt stating that he will be dropping off forms for CHW to forward to Nisha Londono  It is a copy of his lease  CHW did receive this and did scan/email to [employer] so that he can forward to Eliel  CHW did call Brandon to discuss status of pt's Aline application  Jhonatan Heard assisted and informed CHW that pt's application for services paula still pending  Next outreach is scheduled for 6/18/2021

## 2021-06-18 ENCOUNTER — PATIENT OUTREACH (OUTPATIENT)
Dept: FAMILY MEDICINE CLINIC | Facility: CLINIC | Age: 41
End: 2021-06-18

## 2021-06-18 NOTE — PROGRESS NOTES
Outgoing Calls:  6/18/2021    CHW did call [facility] and spoke with Landry Goncalves in reference to status of his [facility] services application  Landry Goncalves stated that pt was approved and notice was sent out yesterday however pt will need to pay for transportation  CHW asked why and was informed that there was an issue with pt name on insurance card  Pt has one last name listed on insurance card however does have two last names on state ID card  Pt will need to change this with the state and inform Lanta so that they can request [place] to pay for travel  CHW did call pt and informed him of this  CHW informed pt that [facility] is closed today due to holiday and can assist pt in calling them on Monday  Pt agreed  Pt asked CHW if she was able to send copy of is manda to  at [facility] Leann FINNEGAN [address] confirmed that has already been completed  Pt thanked CHW  Next outreach is scheduled for 6/21/2021

## 2021-06-21 ENCOUNTER — PATIENT OUTREACH (OUTPATIENT)
Dept: FAMILY MEDICINE CLINIC | Facility: CLINIC | Age: 41
End: 2021-06-21

## 2021-06-21 NOTE — PROGRESS NOTES
Outgoing Calls:  6/21/2021    CHW did call pt to facilitate a three way call to [employer]ILL [employer] to request his last name be updated with his insurance so that Karie Isbell can reach out to insurance and ask for payment of Karie Isbell services  Pt was still in bed when CHW had called but agreed to make this call with CHW  CHW did call [place] [employer] and pt was able to request that his name be updated  Current insurance states pt's name as Lupe Wise but state ID shows pt's name as Mar Leggett Acosta  Pt and CHW were informed that this could take a couple of days before it is updated in the system  CHW informed pt that she will call [facility] middle of week and ask for them to submit request to insurance and see if services will be covered  Pt asked if he would be able to utilize Karie Isbell services to  friends if they needed a ride, CHW informed him that this is not allowed and encouraged him to read [id number] which has been sent to him last week  Pt asked if this service would take him to the airport, CHW explained that she was not sure but that all scheduled rides not for medical appointments would require pt to pay for out of pocket  Pt expressed understanding  Next outreach is scheduled for 6/22/2021

## 2021-06-22 ENCOUNTER — PATIENT OUTREACH (OUTPATIENT)
Dept: FAMILY MEDICINE CLINIC | Facility: CLINIC | Age: 41
End: 2021-06-22

## 2021-06-22 NOTE — PROGRESS NOTES
Outgoing Calls:  6/22/2021    CHW did call [place] to discuss status of pt's services and if Dorothy Gomez will pay for transportation  Forrest Foster assisted and did run pt's information to request payment for travel  Pt's name has been updated with [place] [employer] and has been approved for this service  CHW did call pt and informed him of this update  CHW did schedule a time to meet with pt to apply for senior housing      Next outreach is scheduled for 6/30/2021 at [address] [place] [address], [facility]

## 2021-06-30 ENCOUNTER — PATIENT OUTREACH (OUTPATIENT)
Dept: FAMILY MEDICINE CLINIC | Facility: CLINIC | Age: 41
End: 2021-06-30

## 2021-06-30 NOTE — PROGRESS NOTES
In Person / Narciso Mail:  6/30/2021    CHW did meet with pt at [facility] for scheduled appointment  CHW did provide three housing application for pt and completed with pt  Pt has now applied at [facility], and [employer]  CHW did mail all applications to each agency  CHW informed pt he should call as he receives responses from them  Pt agreed  Next outreach is scheduled for 7/7/2021

## 2021-07-09 ENCOUNTER — PATIENT OUTREACH (OUTPATIENT)
Dept: FAMILY MEDICINE CLINIC | Facility: CLINIC | Age: 41
End: 2021-07-09

## 2021-07-09 NOTE — PROGRESS NOTES
Outgoing Call / Chart Review:  7/9/2021    CHW did call pt to inquire about any responses from senior [facility] facilities that Leann Horton assisted pt in applying for  Pt not available and a voice message was left requesting a call in return  Next outreach is scheduled for 7/16/2021

## 2021-07-12 ENCOUNTER — PATIENT OUTREACH (OUTPATIENT)
Dept: FAMILY MEDICINE CLINIC | Facility: CLINIC | Age: 41
End: 2021-07-12

## 2021-07-12 NOTE — PROGRESS NOTES
Incoming Call:  7/12/2021    Pt called CHW to inform her that he received two letters one from [facility] which stated that the housing program for people under the age of 58 has been closed and therefore his application was denied  The other letter was from [facility] and he was unable to read to CHW what the letter said  Pt requested to meet with CHW to show her the letter       Next outreach is scheduled for 7/13/21 at [address] [place] [address]

## 2021-07-13 ENCOUNTER — PATIENT OUTREACH (OUTPATIENT)
Dept: FAMILY MEDICINE CLINIC | Facility: CLINIC | Age: 41
End: 2021-07-13

## 2021-07-13 NOTE — PROGRESS NOTES
Incoming Call:  7/13/2021    CHW did receive a call from pt stating that he needed to cancel appointment today with CHW as he needs to meet with his landlord  Appointment was rescheduled       Next outreach is scheduled for 7/16/2021 at [facility], [facility]

## 2021-07-16 ENCOUNTER — PATIENT OUTREACH (OUTPATIENT)
Dept: FAMILY MEDICINE CLINIC | Facility: CLINIC | Age: 41
End: 2021-07-16

## 2021-07-16 NOTE — PROGRESS NOTES
Incoming Call:  7/16/2021    CHW did receive a call from pt stating that he will not be able to make it to meet with her today  A new appointment was scheduled       Next outreach is scheduled for 7/22/2021 at noon, at [place]

## 2021-07-22 ENCOUNTER — PATIENT OUTREACH (OUTPATIENT)
Dept: FAMILY MEDICINE CLINIC | Facility: CLINIC | Age: 41
End: 2021-07-22

## 2021-07-22 NOTE — PROGRESS NOTES
Outgoing Call:  7/22/2021    CHW did call pt to confirm he was at [place] for scheduled appointment at noon  Pt stated that he was still in bed and apologized for not showing for appointment  Pt has missed several scheduled appointments with CHW  CHW did offer new date to meet but informed pt she would need to close this referral if he is a no call, no show, for that appointment  Pt expressed understanding  Pt stated that he is looking to move from his current home since is landlord is selling the building  CHW informed pt she will not be able to assist him if he does not show to appointments       Final outreach is scheduled for 7/29/2021 at [facility] [employer] [facility], at [place]

## 2021-07-29 ENCOUNTER — PATIENT OUTREACH (OUTPATIENT)
Dept: FAMILY MEDICINE CLINIC | Facility: CLINIC | Age: 41
End: 2021-07-29
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
## 2021-09-02 ENCOUNTER — TELEPHONE (OUTPATIENT)
Dept: PSYCHIATRY | Facility: CLINIC | Age: 41
End: 2021-09-02

## 2021-09-08 ENCOUNTER — RA CDI HCC (OUTPATIENT)
Dept: OTHER | Facility: HOSPITAL | Age: 41
End: 2021-09-08

## 2021-09-08 NOTE — PROGRESS NOTES
[facility]  coding opportunities       Chart reviewed, no opportunity found: CHART REVIEWED, NO OPPORTUNITY FOUND                        Patients insurance company: [employer]

## 2021-10-11 ENCOUNTER — PATIENT OUTREACH (OUTPATIENT)
Dept: FAMILY MEDICINE CLINIC | Facility: CLINIC | Age: 41
End: 2021-10-11

## 2021-10-13 ENCOUNTER — TELEPHONE (OUTPATIENT)
Dept: PSYCHIATRY | Facility: CLINIC | Age: 41
End: 2021-10-13

## 2021-10-13 ENCOUNTER — PATIENT OUTREACH (OUTPATIENT)
Dept: FAMILY MEDICINE CLINIC | Facility: CLINIC | Age: 41
End: 2021-10-13

## 2021-10-13 NOTE — TELEPHONE ENCOUNTER
Behavorial Health Outpatient Intake Questions    Referred by: PCP    Please advised interviewee that they need to answer all questions truthfully to allow for best care and any misrepresentations of information may affect their ability to be seen at this clinic   => Was this discussed? Yes     [facility] -     Presenting Problem (in patient's words): Anxiety  Are there any developmental disabilities? ? If yes, can they speak to you on the phone? If they are too limited to speak to you on phone, refer out No    Are you taking any psychiatric medications? No    => If yes, who prescribes? If yes, are they injectable medications? Does the patient have a language barrier or hearing impairment? No    Have you been treated at [facility] by a therapist or a doctor in the past? If yes, who? No    Has the patient been hospitalized for mental health? No   If yes, how long ago was last hospitalization and where was it? Do you actively use alcohol or marijuana or illegal substances? If yes, what and how much - refer out to Drug and alcohol treatment if use is excessive or daily use of illegal substances No concerns of substance abuse are reported  Patient did state he does smoke marijuana     Do you have a community treatment team or ? No    Legal History-     Does the patient have any history of arrests, correction/custodial time, or DUIs? No  If Yes-  1) What types of charges? 2) When were they last incarcerated? 3) Are they currently on parole or probation? Minor Child-    Who has custody of the child? Is there a custody agreement? If there is a custody agreement remind parent that they must bring a copy to the first appt or they will not be seen       Intake Team, please check with provider before scheduling if flags come up such as:  - complex case  - legal history (other than DUI)  - communication barrier concerns are present  - if, in your judgment, this needs further review    ACCEPTED as a patient Yes  => Appointment Date: 11/22/21 @1:00 pm     Referred Elsewhere? No    Name of Nikhil Cho 34 ID# [id number]  [place] [facility] [employer] Phone #  If ins is primary or secondary  If patient is a minor, parents information such as Name, D  O B of guarantor

## 2021-10-21 ENCOUNTER — HOSPITAL ENCOUNTER (EMERGENCY)
Facility: HOSPITAL | Age: 41
Discharge: HOME | End: 2021-10-21
Attending: EMERGENCY MEDICINE
Payer: COMMERCIAL

## 2021-10-21 ENCOUNTER — HOSPITAL ENCOUNTER (EMERGENCY)
Facility: HOSPITAL | Age: 41
End: 2021-10-22
Attending: EMERGENCY MEDICINE
Payer: COMMERCIAL

## 2021-10-21 VITALS
DIASTOLIC BLOOD PRESSURE: 79 MMHG | RESPIRATION RATE: 20 BRPM | SYSTOLIC BLOOD PRESSURE: 112 MMHG | TEMPERATURE: 97.8 F | HEART RATE: 96 BPM | OXYGEN SATURATION: 100 %

## 2021-10-21 DIAGNOSIS — F22 PARANOIA (HCC): Primary | ICD-10-CM

## 2021-10-21 DIAGNOSIS — F22 PARANOID DELUSION (HCC): Primary | ICD-10-CM

## 2021-10-21 LAB
AMPHETAMINES SERPL QL SCN: POSITIVE
BARBITURATES UR QL: NEGATIVE
BENZODIAZ UR QL: NEGATIVE
COCAINE UR QL: POSITIVE
ETHANOL EXG-MCNC: 0 MG/DL
ETHANOL EXG-MCNC: 0 MG/DL
FLUAV RNA RESP QL NAA+PROBE: NEGATIVE
FLUBV RNA RESP QL NAA+PROBE: NEGATIVE
METHADONE UR QL: NEGATIVE
OPIATES UR QL SCN: NEGATIVE
OXYCODONE+OXYMORPHONE UR QL SCN: NEGATIVE
PCP UR QL: NEGATIVE
RSV RNA RESP QL NAA+PROBE: NEGATIVE
SARS-COV-2 RNA RESP QL NAA+PROBE: NEGATIVE
THC UR QL: POSITIVE

## 2021-10-21 PROCEDURE — 82075 ASSAY OF BREATH ETHANOL: CPT

## 2021-10-21 PROCEDURE — 99284 EMERGENCY DEPT VISIT MOD MDM: CPT | Performed by: EMERGENCY MEDICINE

## 2021-10-21 PROCEDURE — 0241U HB NFCT DS VIR RESP RNA 4 TRGT: CPT | Performed by: EMERGENCY MEDICINE

## 2021-10-21 PROCEDURE — 80053 COMPREHEN METABOLIC PANEL: CPT | Performed by: EMERGENCY MEDICINE

## 2021-10-21 PROCEDURE — 80307 DRUG TEST PRSMV CHEM ANLYZR: CPT | Performed by: EMERGENCY MEDICINE

## 2021-10-21 PROCEDURE — 82075 ASSAY OF BREATH ETHANOL: CPT | Performed by: EMERGENCY MEDICINE

## 2021-10-21 PROCEDURE — 99285 EMERGENCY DEPT VISIT HI MDM: CPT | Performed by: EMERGENCY MEDICINE

## 2021-10-21 PROCEDURE — 99285 EMERGENCY DEPT VISIT HI MDM: CPT

## 2021-10-21 PROCEDURE — 85027 COMPLETE CBC AUTOMATED: CPT | Performed by: EMERGENCY MEDICINE

## 2021-10-21 PROCEDURE — 36415 COLL VENOUS BLD VENIPUNCTURE: CPT | Performed by: EMERGENCY MEDICINE

## 2021-10-21 RX ORDER — QUETIAPINE FUMARATE 25 MG/1
25 TABLET, FILM COATED ORAL ONCE
Status: COMPLETED | OUTPATIENT
Start: 2021-10-21 | End: 2021-10-21

## 2021-10-21 RX ADMIN — QUETIAPINE FUMARATE 25 MG: 25 TABLET, FILM COATED ORAL at 21:11

## 2021-10-22 VITALS
DIASTOLIC BLOOD PRESSURE: 64 MMHG | RESPIRATION RATE: 16 BRPM | TEMPERATURE: 98.6 F | HEART RATE: 63 BPM | OXYGEN SATURATION: 99 % | SYSTOLIC BLOOD PRESSURE: 113 MMHG

## 2021-10-22 LAB
ALBUMIN SERPL BCP-MCNC: 3.2 G/DL (ref 3.5–5)
ALP SERPL-CCNC: 87 U/L (ref 46–116)
ALT SERPL W P-5'-P-CCNC: 26 U/L (ref 12–78)
AMPHETAMINES SERPL QL SCN: POSITIVE
ANION GAP SERPL CALCULATED.3IONS-SCNC: 5 MMOL/L (ref 4–13)
AST SERPL W P-5'-P-CCNC: 12 U/L (ref 5–45)
BARBITURATES UR QL: NEGATIVE
BENZODIAZ UR QL: NEGATIVE
BILIRUB SERPL-MCNC: 0.44 MG/DL (ref 0.2–1)
BUN SERPL-MCNC: 12 MG/DL (ref 5–25)
CALCIUM ALBUM COR SERPL-MCNC: 9.4 MG/DL (ref 8.3–10.1)
CALCIUM SERPL-MCNC: 8.8 MG/DL (ref 8.3–10.1)
CHLORIDE SERPL-SCNC: 107 MMOL/L (ref 100–108)
CO2 SERPL-SCNC: 28 MMOL/L (ref 21–32)
COCAINE UR QL: POSITIVE
CREAT SERPL-MCNC: 0.74 MG/DL (ref 0.6–1.3)
ERYTHROCYTE [DISTWIDTH] IN BLOOD BY AUTOMATED COUNT: 12.3 % (ref 11.6–15.1)
GFR SERPL CREATININE-BSD FRML MDRD: 117 ML/MIN/1.73SQ M
GLUCOSE SERPL-MCNC: 117 MG/DL (ref 65–140)
HCT VFR BLD AUTO: 43.3 % (ref 36.5–49.3)
HGB BLD-MCNC: 14.5 G/DL (ref 12–17)
MCH RBC QN AUTO: 30.1 PG (ref 26.8–34.3)
MCHC RBC AUTO-ENTMCNC: 33.5 G/DL (ref 31.4–37.4)
MCV RBC AUTO: 90 FL (ref 82–98)
METHADONE UR QL: NEGATIVE
OPIATES UR QL SCN: NEGATIVE
OXYCODONE+OXYMORPHONE UR QL SCN: NEGATIVE
PCP UR QL: NEGATIVE
PLATELET # BLD AUTO: 187 THOUSANDS/UL (ref 149–390)
PMV BLD AUTO: 10.5 FL (ref 8.9–12.7)
POTASSIUM SERPL-SCNC: 3.3 MMOL/L (ref 3.5–5.3)
PROT SERPL-MCNC: 6.4 G/DL (ref 6.4–8.2)
RBC # BLD AUTO: 4.82 MILLION/UL (ref 3.88–5.62)
SODIUM SERPL-SCNC: 140 MMOL/L (ref 136–145)
THC UR QL: POSITIVE
WBC # BLD AUTO: 8.16 THOUSAND/UL (ref 4.31–10.16)

## 2021-10-22 PROCEDURE — NC001 PR NO CHARGE: Performed by: EMERGENCY MEDICINE

## 2021-10-25 ENCOUNTER — PATIENT OUTREACH (OUTPATIENT)
Dept: FAMILY MEDICINE CLINIC | Facility: CLINIC | Age: 41
End: 2021-10-25